# Patient Record
Sex: MALE | Race: WHITE | NOT HISPANIC OR LATINO | Employment: FULL TIME | URBAN - METROPOLITAN AREA
[De-identification: names, ages, dates, MRNs, and addresses within clinical notes are randomized per-mention and may not be internally consistent; named-entity substitution may affect disease eponyms.]

---

## 2018-05-14 ENCOUNTER — TRANSCRIBE ORDERS (OUTPATIENT)
Dept: URGENT CARE | Facility: CLINIC | Age: 33
End: 2018-05-14

## 2018-05-14 ENCOUNTER — APPOINTMENT (OUTPATIENT)
Dept: RADIOLOGY | Facility: CLINIC | Age: 33
End: 2018-05-14
Payer: OTHER MISCELLANEOUS

## 2018-05-14 ENCOUNTER — APPOINTMENT (OUTPATIENT)
Dept: URGENT CARE | Facility: CLINIC | Age: 33
End: 2018-05-14
Payer: OTHER MISCELLANEOUS

## 2018-05-14 DIAGNOSIS — S63.602A SPRAIN OF LEFT THUMB, UNSPECIFIED SITE OF FINGER, INITIAL ENCOUNTER: ICD-10-CM

## 2018-05-14 DIAGNOSIS — S63.602A SPRAIN OF LEFT THUMB, UNSPECIFIED SITE OF FINGER, INITIAL ENCOUNTER: Primary | ICD-10-CM

## 2018-05-14 PROCEDURE — 73140 X-RAY EXAM OF FINGER(S): CPT

## 2018-05-14 PROCEDURE — 11740 EVACUATION SUBUNGUAL HMTMA: CPT

## 2018-05-14 PROCEDURE — 99283 EMERGENCY DEPT VISIT LOW MDM: CPT

## 2018-05-14 PROCEDURE — G0382 LEV 3 HOSP TYPE B ED VISIT: HCPCS

## 2024-05-01 ENCOUNTER — OFFICE VISIT (OUTPATIENT)
Dept: URGENT CARE | Facility: CLINIC | Age: 39
End: 2024-05-01
Payer: COMMERCIAL

## 2024-05-01 ENCOUNTER — APPOINTMENT (OUTPATIENT)
Dept: RADIOLOGY | Facility: CLINIC | Age: 39
End: 2024-05-01
Payer: COMMERCIAL

## 2024-05-01 VITALS
SYSTOLIC BLOOD PRESSURE: 128 MMHG | BODY MASS INDEX: 32.35 KG/M2 | OXYGEN SATURATION: 98 % | HEART RATE: 89 BPM | WEIGHT: 226 LBS | TEMPERATURE: 97.9 F | HEIGHT: 70 IN | DIASTOLIC BLOOD PRESSURE: 77 MMHG | RESPIRATION RATE: 16 BRPM

## 2024-05-01 DIAGNOSIS — S62.326A DISPLACED FRACTURE OF SHAFT OF FIFTH METACARPAL BONE, RIGHT HAND, INITIAL ENCOUNTER FOR CLOSED FRACTURE: Primary | ICD-10-CM

## 2024-05-01 DIAGNOSIS — S69.91XA INJURY OF RIGHT HAND, INITIAL ENCOUNTER: ICD-10-CM

## 2024-05-01 PROCEDURE — 73130 X-RAY EXAM OF HAND: CPT

## 2024-05-01 PROCEDURE — G0382 LEV 3 HOSP TYPE B ED VISIT: HCPCS | Performed by: PHYSICIAN ASSISTANT

## 2024-05-01 PROCEDURE — 29125 APPL SHORT ARM SPLINT STATIC: CPT | Performed by: PHYSICIAN ASSISTANT

## 2024-05-01 NOTE — PROGRESS NOTES
[facility]        NAME: Carlyle Silva is a 38 y.o. male  : 1985    MRN: [id number]  DATE: May 1, 2024  TIME: 7:06 PM    Assessment and Plan   Displaced fracture of shaft of fifth metacarpal bone, right hand, initial encounter for closed fracture [S62.326A]  1. Displaced fracture of shaft of fifth metacarpal bone, right hand, initial encounter for closed fracture  XR hand 3+ vw right    Ambulatory Referral to Orthopedic Surgery    Splint application            Patient Instructions   Splint.  Elevate.  Tylenol ibuprofen.  Orthopedics.    Follow up with PCP in 3-5 days.  Proceed to  ER if symptoms worsen.    If tests have been performed at [facility], our office will contact you with results if changes need to be made to the care plan discussed with you at the visit.  [facility]    Chief Complaint     Chief Complaint   Patient presents with    Hand Injury     Right hand pain and swelling after pinching it between a/c unit and windowsill starting Monday.          History of Present Illness       Patient is a 38-year-old male with no significant past medical history presents the office planing of right hand pain and swelling after pinching it between an AC unit and a windowsill 3 days ago.  Pain is rated 6 out of 10 located to the ulnar aspect.  Been taking Tylenol ibuprofen with good pain control.  History of fracture to second metacarpal.  He is right-hand dominant.        Review of Systems   Review of Systems   Musculoskeletal:  Positive for arthralgias and joint swelling.         Current Medications     No current outpatient medications on file.    Current Allergies     Allergies as of 2024 - Reviewed 2024   Allergen Reaction Noted    Vancomycin Rash 2024            The following portions of the patient's history were reviewed and updated as appropriate: allergies, current medications, past family history, past medical history, past social  "history, past surgical history and problem list.     History reviewed. No pertinent past medical history.    Past Surgical History:   Procedure Laterality Date    HERNIA REPAIR      KNEE SURGERY         Family History   Problem Relation Age of Onset    No Known Problems Mother     No Known Problems Father          Medications have been verified.        Objective   /77   Pulse 89   Temp 97.9 °F (36.6 °C)   Resp 16   Ht 5' 10\" (1.778 m)   Wt 103 kg (226 lb)   SpO2 98%   BMI 32.43 kg/m²   No LMP for male patient.       Physical Exam     Physical Exam  Vitals and nursing note reviewed.   Constitutional:       Appearance: He is well-developed.   HENT:      Head: Normocephalic and atraumatic.      Right Ear: External ear normal.      Left Ear: External ear normal.      Nose: Nose normal.   Eyes:      General: Lids are normal.      Conjunctiva/sclera: Conjunctivae normal.   Musculoskeletal:      Comments: Right hand: Diffuse swelling to hand with faint ecchymosis.  TTP to fifth metacarpal.  Range of motion intact but limited due to pain and swelling.  Less than 2-second cap refill.  Neurovascular intact.   Skin:     General: Skin is warm and dry.      Capillary Refill: Capillary refill takes less than 2 seconds.   Neurological:      Mental Status: He is alert.         Right hand x-ray: Fracture to the shaft of fifth MC.      Splint application    Date/Time: 5/1/2024 6:00 PM    Performed by: Dequan Radford PA-C  Authorized by: Dequan Radford PA-C  Universal Protocol:  Procedure performed by: (JANA Harper and Tech Susi Santamaria)  Consent: Verbal consent obtained.  Risks and benefits: risks, benefits and alternatives were discussed  Consent given by: patient  Patient understanding: patient states understanding of the procedure being performed  Patient consent: the patient's understanding of the procedure matches consent given  Patient identity confirmed: verbally with patient    Pre-procedure " details:     Sensation:  Normal  Procedure details:     Laterality:  Right    Location:  Hand    Splint type:  Ulnar gutter    Supplies:  Cotton padding, Ortho-Glass and elastic bandage  Post-procedure details:     Pain:  Unchanged    Sensation:  Normal    Patient tolerance of procedure:  Tolerated well, no immediate complications

## 2024-05-01 NOTE — LETTER
May 1, 2024     Patient: Carlyle Silva   YOB: 1985   Date of Visit: 5/1/2024       To Whom It May Concern:    It is my medical opinion that Carlyle Silva should remain out of work until cleared by orthopedics .           Sincerely,        Dequan Radford PA-C

## 2024-05-02 ENCOUNTER — TELEPHONE (OUTPATIENT)
Age: 39
End: 2024-05-02

## 2024-05-02 NOTE — TELEPHONE ENCOUNTER
Patient is being referred to a orthopedics. Please schedule accordingly.    [facility]   [phone]

## 2024-05-03 VITALS
HEIGHT: 70 IN | DIASTOLIC BLOOD PRESSURE: 70 MMHG | WEIGHT: 226 LBS | BODY MASS INDEX: 32.35 KG/M2 | SYSTOLIC BLOOD PRESSURE: 114 MMHG

## 2024-05-03 DIAGNOSIS — S62.336A CLOSED DISPLACED FRACTURE OF NECK OF FIFTH METACARPAL BONE OF RIGHT HAND, INITIAL ENCOUNTER: Primary | ICD-10-CM

## 2024-05-03 PROCEDURE — 99204 OFFICE O/P NEW MOD 45 MIN: CPT | Performed by: ORTHOPAEDIC SURGERY

## 2024-05-03 NOTE — LETTER
May 3, 2024     Patient: Carlyle Silva  YOB: 1985  Date of Visit: 5/3/2024      To Whom it May Concern:    Carlyle Silva is under my professional care. Carlyle was seen in my office on 5/3/2024. Carlyle may return to work with limitations 5/4/24.  There is limitations involved no use of the right hand for lifting/pushing/pulling greater than 2 pounds.  He also needs to be allowed to wear the splint at all times .    If you have any questions or concerns, please don't hesitate to call.         Sincerely,          Irving Jasmine MD        CC: No Recipients

## 2024-05-04 NOTE — PROGRESS NOTES
"The HAND & UPPER EXTREMITY OFFICE VISIT   Referred By:  No referring provider defined for this encounter.      Chief Complaint:     Right hand pain    History of Present Illness:   38 y.o., right hand dominant male presents with 3 days of right-sided hand pain after getting it crushed between the AC unit and a windowsill.  He initially did not seek medical attention but after significant swelling and pain in the hand he sought treatment 2 days prior to presentation.  He went to urgent care on 5/1/2024 and that note was reviewed.  He was placed in a a splint and had radiographs taken.    He presents today for subspecialty evaluation.  He reports he has been splint compliant but he has been using the hand for many activities and continuing to work.  He states that he has pain when doing this but \"has to do what he has to do\".  Denies numbness or tingling.  Reports that he has not had previous right index finger metacarpal fracture in the past and removed his splint himself.  He states that it healed okay but crooked.      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      Past Medical History:  No past medical history on file.  Past Surgical History:   Procedure Laterality Date    HERNIA REPAIR      KNEE SURGERY       Family History   Problem Relation Age of Onset    No Known Problems Mother     No Known Problems Father      Social History     Socioeconomic History    Marital status: Single     Spouse name: Not on file    Number of children: Not on file    Years of education: Not on file    Highest education level: Not on file   Occupational History    Not on file   Tobacco Use    Smoking status: Every Day     Current packs/day: 1.00     Types: Cigarettes    Smokeless tobacco: Never   Vaping Use    Vaping status: Never Used   Substance and Sexual Activity    Alcohol use: Not Currently    Drug use: Never    Sexual activity: Not on " "file   Other Topics Concern    Not on file   Social History Narrative    Not on file     Social Determinants of Health     Financial Resource Strain: Not on file   Food Insecurity: Not on file   Transportation Needs: Not on file   Physical Activity: Not on file   Stress: Not on file   Social Connections: Not on file   Intimate Partner Violence: Not on file   Housing Stability: Not on file     Scheduled Meds:  Continuous Infusions:No current facility-administered medications for this visit.    PRN Meds:.  Allergies   Allergen Reactions    Vancomycin Rash     \"Red man syndrome\"           Physical Examination:    /70   Ht 5' 10\" (1.778 m)   Wt 103 kg (226 lb)   BMI 32.43 kg/m²     Gen: A&Ox3, NAD  Cardiac: regular rate  Chest: non labored breathing  Abdomen: Non-distended      Right Upper Extremity:  Splint clean and dry, removed, underlying skin intact, swelling of the dorsal hand.  He does have a radial deviation deformity of the small finger at the distal phalanx, Kirners deformity which is similar to the contralateral side  When he flexes his fingers down the tip of his small finger does slightly flexed underneath the middle finger but this is similar to the contralateral side.  His range of motion is limited and his tip to palm distance is approximately 5 cm so it is difficult to tell if this is completely normal for him or if he has more of a rotational deformity  Near full extension of the fingers  Tender at the metacarpal neck of the small finger  Sensation intact to light touch in the axillary median, ulnar, and radial nerve distributions  2+RP      Left Upper Extremity:  Skin CDI  He does have a radial deviation deformity of the small finger at the distal phalanx, Kirners deformity  To the small finger tuck sign of the middle finger slightly with flexion, he is able to self-correct by abducting his small finger during flexion  Sensation intact to light touch in the axillary median, ulnar, and radial " nerve distributions  5/5 motor  strength  2+RP      Studies:  Radiographs: I personally reviewed and independently interpreted the available radiographs.  5/1/2024: Radiographs of the right hand, multiple views, demonstrate displaced, oblique metacarpal neck fracture of the small finger with few millimeters of shortening.       Assessment and Plan:  1. Closed displaced fracture of neck of fifth metacarpal bone of right hand, initial encounter            38 y.o. male presents with signs and symptoms consistent with the above diagnosis.  We discussed the natural history of this condition and its pathogenesis.  We discussed operative and nonoperative treatment options.    This fracture is fairly displaced and shortened however he does not have an obvious rotational deformity.  He also has good extension already 2 days after injury despite his swelling.  I do not think he develop a significant extensor lag but we did discuss this is a possibility and the patient states he is not concerned by this as long as his functional.  In regards to the possible rotational deformity does seem similar to the contralateral side but is hard to tell it is not in full motion.  We discussed treatment options including operative and nonoperative management.  Patient states that he would be comfortable proceeding with nonoperative management as he he did not notice any significant rotational deformity right after the injury when he was able to better bend his fingers and also he does not have insurance and would like to avoid surgery if possible.  He like to proceed nonoperative management.  We discussed that this would entail a brief period of mobilization and then range of motion.  Since he has some significant swelling and discomfort I will place him in a intrinsic plus splint with the PIP joints free to allow for early PIP range of motion.  I recommend avoiding lifting greater than 5 pounds with this hand until the fracture heals.   A work note was given limiting his lifting with the right side.  He states that he will likely not follow these restrictions but he will keep the splint on and return for follow-up in 2 weeks for repeat radiographs and to check his rotation of the finger after his swelling decreases.    he expressed understanding of the plan and agreed. We encouraged them to contact our office with any questions or concerns.         Irving Jasmine MD  Hand and Upper Extremity Surgery        *This note was dictated using Dragon voice recognition software. Please excuse any word substitutions or errors.*

## 2024-05-06 ENCOUNTER — TELEPHONE (OUTPATIENT)
Age: 39
End: 2024-05-06

## 2024-05-06 NOTE — TELEPHONE ENCOUNTER
Caller: Patient     Doctor: Mitali     Reason for call: Patient is unable to view the work note in his [employer]. Please send as an attachment in a [employer] message, if possible     Call back#: [phone]

## 2024-05-09 NOTE — TELEPHONE ENCOUNTER
Lvm letting patient know that work note is available in my chart, he needs to scroll down to letters. Advised patient if he is still not able to see it to use a computer or send us a fax number so we can fax over work letter. No further actions at this time.

## 2024-05-17 VITALS
BODY MASS INDEX: 32.35 KG/M2 | SYSTOLIC BLOOD PRESSURE: 122 MMHG | WEIGHT: 226 LBS | DIASTOLIC BLOOD PRESSURE: 76 MMHG | HEIGHT: 70 IN

## 2024-05-17 DIAGNOSIS — S62.336D CLOSED DISPLACED FRACTURE OF NECK OF FIFTH METACARPAL BONE OF RIGHT HAND WITH ROUTINE HEALING, SUBSEQUENT ENCOUNTER: Primary | ICD-10-CM

## 2024-05-17 PROCEDURE — 99213 OFFICE O/P EST LOW 20 MIN: CPT | Performed by: ORTHOPAEDIC SURGERY

## 2024-05-17 NOTE — PROGRESS NOTES
"  HAND & UPPER EXTREMITY OFFICE VISIT   Referred By:  No referring provider defined for this encounter.      Chief Complaint:     Right hand pain   DOI 5/1/24    Previous History:   Previously seen on 5/3/24. At that point he was placed in an intrinsic plus splint and lifting restrictions    Interval History:  Since the last visit he has been compliant with the splint except for showering. He has been back to work using just his left hand. His pain is minimal and does not take any analgesics. Denies any numbness or tingling    Past Medical History:  No past medical history on file.  Past Surgical History:   Procedure Laterality Date    HERNIA REPAIR      KNEE SURGERY       Family History   Problem Relation Age of Onset    No Known Problems Mother     No Known Problems Father      Social History     Socioeconomic History    Marital status: Single     Spouse name: Not on file    Number of children: Not on file    Years of education: Not on file    Highest education level: Not on file   Occupational History    Not on file   Tobacco Use    Smoking status: Every Day     Current packs/day: 1.00     Types: Cigarettes    Smokeless tobacco: Never   Vaping Use    Vaping status: Never Used   Substance and Sexual Activity    Alcohol use: Not Currently    Drug use: Never    Sexual activity: Not on file   Other Topics Concern    Not on file   Social History Narrative    Not on file     Social Determinants of Health     Financial Resource Strain: Not on file   Food Insecurity: Not on file   Transportation Needs: Not on file   Physical Activity: Not on file   Stress: Not on file   Social Connections: Not on file   Intimate Partner Violence: Not on file   Housing Stability: Not on file     Scheduled Meds:  Continuous Infusions:No current facility-administered medications for this visit.    PRN Meds:.  Allergies   Allergen Reactions    Vancomycin Rash     \"Red man syndrome\"       Physical Examination:    /76   Ht 5' 10\" (1.778 " m)   Wt 103 kg (226 lb)   BMI 32.43 kg/m²     Gen: A&Ox3, NAD  Cardiac: regular rate  Chest: non labored breathing  Abdomen: Non-distended    Cervcial Spine: Negative Spurling's    Right Upper Extremity:  He does have a radial deviation deformity of the small finger at the distal phalanx, Kirners deformity which is similar to the contralateral side  When he flexes his fingers down the tip of his small finger does slightly flexed underneath the middle finger but this is similar to the contralateral side. He is able to almost make a composite fist.  Near full extension of the fingers  Tender at the metacarpal neck of the small finger  Sensation intact to light touch in the axillary median, ulnar, and radial nerve distributions  2+RP      Studies:  Radiographs: I personally reviewed and independently interpreted the available radiographs.   5/17/2024: Radiographs of the right hand, multiple views, demonstrate displaced, oblique metacarpal neck fracture of the small finger with few millimeters of shortening, unchanged alignment since last xray on 5/1.       Assessment and Plan:  1. Closed displaced fracture of neck of fifth metacarpal bone of right hand with routine healing, subsequent encounter  XR hand 3+ vw right          38 y.o. male presents in follow up for the above diagnosis. We discussed the natural history of this condition and its pathogenesis.  We discussed operative and nonoperative treatment options.     This fracture is unchanged since the injury which continues to be displaced and shortened however he does not have an obvious rotational deformity.  He also has almost full extension.  I do not think he'll develop a significant extensor lag but we did discuss this is a possibility and the patient states he is not concerned by this as long as his functional.  In regards to the possible rotational deformity does seem similar to the contralateral side but is hard to tell it is not in full motion.  We  discussed treatment options including operative and nonoperative management.  Patient states that he would like to continue with nonoperative management as he does not have insurance and would like to avoid surgery if possible.  We discussed that he can discontinue the splint and start using gabriela tape. He can start doing ROM of the MCP and PIP joints. He can work as long as he wears the gabriela tape and  avoiding lifting greater than 5 pounds with this hand until the fracture heals.  A work note was given limiting his lifting with the right side.  He will return for follow-up in 3 weeks for repeat radiographs     It is recommended he return to the office in 3 weeks, or sooner should symptoms worsen    he expressed understanding of the plan and agreed. We encouraged them to contact our office with any questions or concerns.       Irving Jasmine MD  Hand and Upper Extremity Surgery      *This note was dictated using Dragon voice recognition software. Please excuse any word substitutions or errors.*

## 2024-05-17 NOTE — LETTER
May 17, 2024     Patient: Carlyle Silva  YOB: 1985  Date of Visit: 5/17/2024      To Whom it May Concern:    Carlyle Silva is under my professional care. Carlyle was seen in my office on 5/17/2024. Carlyle may return to work with limitations has to use gabriela tape to right small and ring fingers. Avoid lifting more than 5 lbs to the right hand .    If you have any questions or concerns, please don't hesitate to call.         Sincerely,          Irving Jasmine MD        CC: No Recipients

## 2024-06-07 VITALS
SYSTOLIC BLOOD PRESSURE: 125 MMHG | DIASTOLIC BLOOD PRESSURE: 78 MMHG | BODY MASS INDEX: 32.35 KG/M2 | WEIGHT: 226 LBS | HEIGHT: 70 IN

## 2024-06-07 DIAGNOSIS — S62.336D CLOSED DISPLACED FRACTURE OF NECK OF FIFTH METACARPAL BONE OF RIGHT HAND WITH ROUTINE HEALING, SUBSEQUENT ENCOUNTER: Primary | ICD-10-CM

## 2024-06-07 PROCEDURE — 99213 OFFICE O/P EST LOW 20 MIN: CPT | Performed by: ORTHOPAEDIC SURGERY

## 2024-06-07 NOTE — PROGRESS NOTES
"  HAND & UPPER EXTREMITY OFFICE VISIT   Referred By:  No referring provider defined for this encounter.      Chief Complaint:     Right hand pain   DOI 5/1/24    Previous History:   Previously seen on 5/17/2024. He was advsied to gabriela tape his fingers as we will treat his fracture conservatively per patients request    Interval History:  Since the last visit he has been compliant with the gabriela taping. He has some pain/tenderness with certain motions and gripping but over all he is improving.     Past Medical History:  No past medical history on file.  Past Surgical History:   Procedure Laterality Date    HERNIA REPAIR      KNEE SURGERY       Family History   Problem Relation Age of Onset    No Known Problems Mother     No Known Problems Father      Social History     Socioeconomic History    Marital status: Single     Spouse name: Not on file    Number of children: Not on file    Years of education: Not on file    Highest education level: Not on file   Occupational History    Not on file   Tobacco Use    Smoking status: Every Day     Current packs/day: 1.00     Types: Cigarettes    Smokeless tobacco: Never   Vaping Use    Vaping status: Never Used   Substance and Sexual Activity    Alcohol use: Not Currently    Drug use: Never    Sexual activity: Not on file   Other Topics Concern    Not on file   Social History Narrative    Not on file     Social Determinants of Health     Financial Resource Strain: Not on file   Food Insecurity: Not on file   Transportation Needs: Not on file   Physical Activity: Not on file   Stress: Not on file   Social Connections: Not on file   Intimate Partner Violence: Not on file   Housing Stability: Not on file     Scheduled Meds:  Continuous Infusions:No current facility-administered medications for this visit.    PRN Meds:.  Allergies   Allergen Reactions    Vancomycin Rash     \"Red man syndrome\"       Physical Examination:    /78   Ht 5' 10\" (1.778 m)   Wt 103 kg (226 lb)   " BMI 32.43 kg/m²     Gen: A&Ox3, NAD  Cardiac: regular rate  Chest: non labored breathing  Abdomen: Non-distended    Cervcial Spine: Negative Spurling's    Right Upper Extremity:  He is able to make a composite fist.  Near full extension of the fingers with a small lag of his small finger at his PIP  Non-tender at the metacarpal neck of the small finger  Mild swelling at his 5th digit and dorsal hand  Sensation intact to light touch in the axillary median, ulnar, and radial nerve distributions  2+RP      Studies:  Radiographs: I personally reviewed and independently interpreted the available radiographs.  6/7/2024: Radiographs of the right hand, multiple views, demonstrate stable alignment with interval bridging callus formation.       5/17/2024: Radiographs of the right hand, multiple views, demonstrate displaced, oblique metacarpal neck fracture of the small finger with few millimeters of shortening, unchanged alignment since last xray on 5/1.       Assessment and Plan:  1. Closed displaced fracture of neck of fifth metacarpal bone of right hand with routine healing, subsequent encounter  XR hand 3+ vw right          38 y.o. male presents in follow up for the above diagnosis. We discussed the natural history of this condition and its pathogenesis.  We discussed operative and nonoperative treatment options.     XR were reviewed and they were discussed with the patient  His fracture is healing and there are signs of callus bone formation noted  I recommend he continue with conservative treatment. He may continue to be as active as tolerated for most activities. He may stop gabriela taping at this time. He should avoid heavy lifting until his next visit    It is recommended he return to the office in 6 weeks, or sooner should symptoms worsen with repeat radiographs    he expressed understanding of the plan and agreed. We encouraged them to contact our office with any questions or concerns.       Irving Jasmine,  MD  Hand and Upper Extremity Surgery      *This note was dictated using Dragon voice recognition software. Please excuse any word substitutions or errors.*

## 2024-07-24 ENCOUNTER — OFFICE VISIT (OUTPATIENT)
Dept: URGENT CARE | Facility: CLINIC | Age: 39
End: 2024-07-24

## 2024-07-24 VITALS
WEIGHT: 229.4 LBS | RESPIRATION RATE: 18 BRPM | HEART RATE: 73 BPM | BODY MASS INDEX: 32.84 KG/M2 | TEMPERATURE: 97.5 F | OXYGEN SATURATION: 100 % | SYSTOLIC BLOOD PRESSURE: 115 MMHG | DIASTOLIC BLOOD PRESSURE: 67 MMHG | HEIGHT: 70 IN

## 2024-07-24 DIAGNOSIS — L03.317 CELLULITIS AND ABSCESS OF BUTTOCK: Primary | ICD-10-CM

## 2024-07-24 DIAGNOSIS — L02.31 CELLULITIS AND ABSCESS OF BUTTOCK: Primary | ICD-10-CM

## 2024-07-24 PROCEDURE — G0382 LEV 3 HOSP TYPE B ED VISIT: HCPCS | Performed by: PREVENTIVE MEDICINE

## 2024-07-24 RX ORDER — CEPHALEXIN 500 MG/1
CAPSULE ORAL
Qty: 28 CAPSULE | Refills: 0 | Status: SHIPPED | OUTPATIENT
Start: 2024-07-24 | End: 2024-07-31

## 2024-07-24 NOTE — PROGRESS NOTES
"  [facility]        NAME: Carlyle Silva is a 38 y.o. male  : 1985    MRN: [id number]  DATE: 2024  TIME: 6:55 PM    Assessment and Plan   Cellulitis and abscess of buttock [L02.31, L03.317]  1. Cellulitis and abscess of buttock  cephalexin (KEFLEX) 500 mg capsule            Patient Instructions       Follow up with PCP in 3-5 days.  Proceed to  ER if symptoms worsen.    If tests have been performed at [facility], our office will contact you with results if changes need to be made to the care plan discussed with you at the visit.  You can review your full results on [facility].    Chief Complaint     Chief Complaint   Patient presents with    Pain     Boil to left buttock x 2 weeks          History of Present Illness       Repeated abscesses in the buttock area.        Review of Systems   Review of Systems   Skin:  Positive for color change and wound.         Current Medications       Current Outpatient Medications:     cephalexin (KEFLEX) 500 mg capsule, To restart in 1 4 times a day, Disp: 28 capsule, Rfl: 0    Current Allergies     Allergies as of 2024 - Reviewed 2024   Allergen Reaction Noted    Vancomycin Rash 2024            The following portions of the patient's history were reviewed and updated as appropriate: allergies, current medications, past family history, past medical history, past social history, past surgical history and problem list.     History reviewed. No pertinent past medical history.    Past Surgical History:   Procedure Laterality Date    HERNIA REPAIR      KNEE SURGERY         Family History   Problem Relation Age of Onset    No Known Problems Mother     No Known Problems Father          Medications have been verified.        Objective   /67   Pulse 73   Temp 97.5 °F (36.4 °C) (Temporal)   Resp 18   Ht 5' 10\" (1.778 m)   Wt 104 kg (229 lb 6.4 oz)   SpO2 100%   BMI 32.92 kg/m²   No LMP for male patient.       Physical Exam "     Physical Exam  Skin:     Comments: 1 indurated erythematous abscess in the buttock area 2 inches x 2 inches.  2 other smaller abscess is in the area     Note, did not want I&D at this time.

## 2024-07-24 NOTE — PATIENT INSTRUCTIONS
Warm salt water soaks of the buttock 3-4 times a day.  Try and draw the abscesses to ahead and perhaps open them.  You need to follow-up with a dermatologist to find out why you continue to get these abscesses.  Biotic as directed

## 2025-01-31 VITALS — HEIGHT: 70 IN | BODY MASS INDEX: 34.3 KG/M2 | WEIGHT: 239.6 LBS

## 2025-01-31 DIAGNOSIS — M25.561 ACUTE PAIN OF RIGHT KNEE: Primary | ICD-10-CM

## 2025-01-31 DIAGNOSIS — S83.411A SPRAIN OF MEDIAL COLLATERAL LIGAMENT OF RIGHT KNEE, INITIAL ENCOUNTER: ICD-10-CM

## 2025-01-31 PROCEDURE — 99213 OFFICE O/P EST LOW 20 MIN: CPT | Performed by: ORTHOPAEDIC SURGERY

## 2025-01-31 RX ORDER — NAPROXEN 500 MG/1
500 TABLET ORAL 2 TIMES DAILY WITH MEALS
COMMUNITY
Start: 2025-01-30

## 2025-01-31 NOTE — LETTER
January 31, 2025     Patient: Carlyle Silva  YOB: 1985  Date of Visit: 1/31/2025      To Whom it May Concern:    Carlyle Silva is under my professional care. Carlyle was seen in my office on 1/31/2025. Carlyle may return to work on 2/3/2025.  He must be permitted to wear his knee brace at all times and is not permitted to bear weight on the right lower extremity.  He should be provided primarily sedentary duty, intermittent walking to get in and out of the building and if necessary to go to the break room or to the restroom.  These restrictions will remain in effect for the next 3 weeks .    If you have any questions or concerns, please don't hesitate to call.         Sincerely,          Shilo Velasco DO        CC: No Recipients

## 2025-01-31 NOTE — PROGRESS NOTES
Assessment/Plan:  Assessment & Plan   Diagnoses and all orders for this visit:    Acute pain of right knee  -     T-Rom  Post Op Knee Brace    Sprain of medial collateral ligament of right knee, initial encounter  -     Crutches        Plan: A T ROM brace was provided as well as crutches.  The brace should remain in place at all times and he is to remain nonweightbearing.  Range of motion was limited from 30 to 60 degrees.  The brace may be removed for cleansing with precautions as reviewed.  He was given a note for limited duty at work.  I will see him in 3 weeks for reevaluation.  The office should be contacted if questions or concerns arise in the interim.    Subjective:   Patient ID: Carlyle Silva is a 39 y.o. male.    KAROLINA Tadeo is a 39-year-old male who injured his right knee on 1/26/2025.  He states he was backing up while taking his dogs out when his knee gave way.  He is unsure of the exact mechanism of injury.  He noted swelling within a couple of hours and presented to an urgent care center inside the [facility] and was provided with a knee immobilizer and instructed to seek orthopedic follow-up.  He presents today with a CD-ROM including his x-rays.  He is not wearing his knee immobilizer, stating that he needed to remove it so he could drive to the office as his wife is not permitted to drive at this time.  He complains of diffuse pain immediately after the injury but more localized medial knee pain over the last 24 to 48 hours.  He notes persistent swelling.  He denies any history of prior injuries to his right knee but suffered a significant injury to his left knee several years ago.    The following portions of the patient's history were reviewed and updated as appropriate: allergies, current medications, past family history, past medical history, past social history, past surgical history, and problem list.    Review of Systems: The patient's 10 organ system review is negative except as in  the history of chief complaint    Objective:  Ortho Exam  The right knee exam demonstrates a moderate effusion.  There is no ecchymosis.  The skin is warm and dry.  He had full extension of the knee and allow flexion to about 120 degrees complaining of pain with flexion greater than 90 degrees.  The lateral femoral condyle, joint line and tibial plateau were nontender.  The patella, patella tendon and tibial tubercle were nontender.  He has tenderness over the medial femoral condyle at the collateral ligament origin.  The medial joint line and medial tibial plateau were nontender.  He has slight laxity with valgus stress at 30 degrees with a solid endpoint and with mild pain.  He denies complaints with in full extension with valgus stress and there is no evident laxity appreciated.  Nitin's test could not be completed due to his inability to fully flex the knee.  Apley's compression/distraction test is negative.  Cruciate ligaments are stable.  The remainder of the lower extremity examination is benign.      X-rays including AP and lateral views of his right knee demonstrate no evidence of bony injury.

## 2025-02-21 VITALS — BODY MASS INDEX: 34.22 KG/M2 | WEIGHT: 239 LBS | HEIGHT: 70 IN

## 2025-02-21 DIAGNOSIS — S83.411D SPRAIN OF MEDIAL COLLATERAL LIGAMENT OF RIGHT KNEE, SUBSEQUENT ENCOUNTER: Primary | ICD-10-CM

## 2025-02-21 PROCEDURE — 99213 OFFICE O/P EST LOW 20 MIN: CPT | Performed by: ORTHOPAEDIC SURGERY

## 2025-02-21 NOTE — LETTER
February 21, 2025     Patient: Carlyle Silva  YOB: 1985  Date of Visit: 2/21/2025      To Whom it May Concern:    Carlyle Silva is under my professional care. Carlyle was seen in my office on 2/21/2025. Carlyle may return to work on 2/24/2025.  He is permitted to work without his brace.  He is permitted intermittent sitting, standing and walking as tolerated.  He is allowed to lift up to 25 pounds.  He should not work on elevated structures.  He should not be expected to squat or stoop.  These restrictions will remain in effect until he is rechecked in 2 weeks .    If you have any questions or concerns, please don't hesitate to call.         Sincerely,          Shilo Velasco DO        CC: No Recipients

## 2025-02-21 NOTE — PROGRESS NOTES
"Name: Carlyle Silva      : 1985      MRN: [id number]  Encounter Provider: Shilo Velasco DO  Encounter Date: 2025   Encounter department: [facility] [place]  :  Assessment & Plan  Sprain of medial collateral ligament of right knee, subsequent encounter  I would recommend follow-up in 2 weeks.  I have recommended therapy and a prescription was provided.  He may discontinue the brace and may return to work with limitations as discussed and a note provided today.  The office should be contacted if questions or concerns arise prior to his scheduled follow-up.  Orders:    Ambulatory referral to Physical Therapy; Future        History of Present Illness   HPI  Carlyle Silva is a 39 y.o. male who presents for reevaluation of his right knee.  He is doing well at this time.  He notes some tightness or discomfort in the knee when he is trying to get in and out of the shower without the brace.  He notes no discomfort or pain with sleeping at night and does wear the brace.  He maintains the brace throughout the day.       Objective   Ht 5' 10\" (1.778 m)   Wt 108 kg (239 lb)   BMI 34.29 kg/m²      Physical Exam  Exam today demonstrates the brace in place upon arrival.  This was removed without difficulty.  He denies any pain with valgus stress at both full extension and 30 degrees of flexion and has no localized tenderness over the medial knee with palpation.  He did have a slight degree of tenderness along the medial border of the patella.  The anterior and lateral knee were nontender.  He has no significant swelling.  "

## 2025-02-21 NOTE — ASSESSMENT & PLAN NOTE
I would recommend follow-up in 2 weeks.  I have recommended therapy and a prescription was provided.  He may discontinue the brace and may return to work with limitations as discussed and a note provided today.  The office should be contacted if questions or concerns arise prior to his scheduled follow-up.  Orders:    Ambulatory referral to Physical Therapy; Future

## 2025-03-07 VITALS — BODY MASS INDEX: 34.22 KG/M2 | WEIGHT: 239 LBS | HEIGHT: 70 IN

## 2025-03-07 DIAGNOSIS — S83.411D SPRAIN OF MEDIAL COLLATERAL LIGAMENT OF RIGHT KNEE, SUBSEQUENT ENCOUNTER: Primary | ICD-10-CM

## 2025-03-07 PROCEDURE — 99213 OFFICE O/P EST LOW 20 MIN: CPT | Performed by: ORTHOPAEDIC SURGERY

## 2025-03-07 NOTE — ASSESSMENT & PLAN NOTE
I would recommend follow-up as needed.  The office should be contacted if questions or concerns arise.  He was given a note allowing him to return to work at full duty.  He does understand that some residual stiffness/soreness may last for up to 6 months after these injuries.

## 2025-03-07 NOTE — LETTER
March 7, 2025     Patient: Carlyle Silva  YOB: 1985  Date of Visit: 3/7/2025      To Whom it May Concern:    Carlyle Silva is under my professional care. Carlyle was seen in my office on 3/7/2025. Carlyle may return to work on 3/7/2025.  He is permitted to work without restrictions. .    If you have any questions or concerns, please don't hesitate to call.         Sincerely,          Shilo Velasco DO        CC: No Recipients

## 2025-03-07 NOTE — PROGRESS NOTES
"Name: Carlyle Silva      : 1985      MRN: [id number]  Encounter Provider: Shilo Velasco DO  Encounter Date: 3/7/2025   Encounter department: [facility] [place]  :  Assessment & Plan  Sprain of medial collateral ligament of right knee, subsequent encounter  I would recommend follow-up as needed.  The office should be contacted if questions or concerns arise.  He was given a note allowing him to return to work at full duty.  He does understand that some residual stiffness/soreness may last for up to 6 months after these injuries.           History of Present Illness   HPI  Carlyle Silva is a 39 y.o. male who presents for reevaluation of his right knee.  He was last seen in physical therapy was ordered.  He did not attend physical therapy as he felt it was not necessary.  He continues to do well and notes a little bit of stiffness, primarily at the end of the day.  He denies any pain.  He denies any instability.    Objective   Ht 5' 10\" (1.778 m)   Wt 108 kg (239 lb)   BMI 34.29 kg/m²      Physical Exam  The right knee exam today demonstrates full range of motion.  He denies pain with range of motion.  He has no instability with valgus stress in full extension, 30 degrees of flexion or close to 90 degrees of flexion.  He has no tenderness over the medial femoral condyle, tibial plateau or joint line.  There is no effusion noted.  He ambulates without difficulty and without assistive devices.  "